# Patient Record
Sex: FEMALE | Race: WHITE | Employment: OTHER | URBAN - METROPOLITAN AREA
[De-identification: names, ages, dates, MRNs, and addresses within clinical notes are randomized per-mention and may not be internally consistent; named-entity substitution may affect disease eponyms.]

---

## 2023-10-30 ENCOUNTER — HOSPITAL ENCOUNTER (EMERGENCY)
Age: 68
Discharge: HOME | End: 2023-10-30
Attending: EMERGENCY MEDICINE
Payer: MEDICARE

## 2023-10-30 ENCOUNTER — APPOINTMENT (OUTPATIENT)
Dept: CT IMAGING | Age: 68
End: 2023-10-30
Payer: MEDICARE

## 2023-10-30 VITALS
BODY MASS INDEX: 34.73 KG/M2 | SYSTOLIC BLOOD PRESSURE: 155 MMHG | WEIGHT: 221.3 LBS | DIASTOLIC BLOOD PRESSURE: 89 MMHG | HEART RATE: 119 BPM | RESPIRATION RATE: 14 BRPM | OXYGEN SATURATION: 97 % | TEMPERATURE: 97.7 F | HEIGHT: 67 IN

## 2023-10-30 DIAGNOSIS — S01.111A EYEBROW LACERATION, RIGHT, INITIAL ENCOUNTER: Primary | ICD-10-CM

## 2023-10-30 PROCEDURE — 70450 CT HEAD/BRAIN W/O DYE: CPT

## 2023-10-30 PROCEDURE — 12011 RPR F/E/E/N/L/M 2.5 CM/<: CPT

## 2023-10-30 PROCEDURE — 99284 EMERGENCY DEPT VISIT MOD MDM: CPT

## 2023-10-30 PROCEDURE — 72125 CT NECK SPINE W/O DYE: CPT

## 2023-10-30 RX ORDER — LIDOCAINE HYDROCHLORIDE AND EPINEPHRINE 10; 10 MG/ML; UG/ML
20 INJECTION, SOLUTION INFILTRATION; PERINEURAL ONCE
Status: DISCONTINUED | OUTPATIENT
Start: 2023-10-30 | End: 2023-10-30 | Stop reason: HOSPADM

## 2023-10-30 ASSESSMENT — PAIN DESCRIPTION - PAIN TYPE: TYPE: ACUTE PAIN

## 2023-10-30 ASSESSMENT — PAIN - FUNCTIONAL ASSESSMENT
PAIN_FUNCTIONAL_ASSESSMENT: 0-10
PAIN_FUNCTIONAL_ASSESSMENT: NONE - DENIES PAIN

## 2023-10-30 ASSESSMENT — PAIN DESCRIPTION - LOCATION: LOCATION: HIP;FACE

## 2023-10-30 ASSESSMENT — PAIN DESCRIPTION - DESCRIPTORS: DESCRIPTORS: ACHING

## 2023-10-30 ASSESSMENT — PAIN DESCRIPTION - FREQUENCY: FREQUENCY: CONTINUOUS

## 2023-10-30 ASSESSMENT — PAIN DESCRIPTION - ORIENTATION: ORIENTATION: RIGHT

## 2023-10-30 ASSESSMENT — PAIN SCALES - GENERAL: PAINLEVEL_OUTOF10: 5

## 2023-10-30 NOTE — ED NOTES
Patient to ed with complaints of a a left eyebrow laceration and right buttock bruise after a trip and fall today, no loc bleeding controled.      Nish Reynolds RN  10/30/23 4056

## 2023-10-30 NOTE — ED NOTES
Patient given discharge instructions verbal and written, patient verbalized understanding. Alert/oriented X4, Clear speech.   Patient exhibits no distress, ambulates with steady gait per self leaving unit, no further request.      Kaitlin Elam RN  10/30/23 1131

## 2023-10-30 NOTE — DISCHARGE INSTRUCTIONS
As discussed, your CAT scan showed no intracranial injury. You may use triple antibiotic ointment or Neosporin over the wound.   Please have the stitches removed in 7 to 10 days, monitor for any signs of infection such as increased redness swelling or drainage in the next week

## 2023-10-30 NOTE — ED PROVIDER NOTES
[address] ENCOUNTER        Patient Name: Paula Campo  MRN: [id number]  [address] [facility] 1955  Date of evaluation: 10/30/2023  Provider: Rossana Simon MD  PCP: No primary care provider on file. Note Started: 10:10 AM EDT 10/30/23    CHIEF COMPLAINT       Laceration (Right eyebrow )      HISTORY OF PRESENT ILLNESS: 1 or more Elements     History from : Patient    Limitations to history : None    Paula Campo is a 76 y.o. female who presents for evaluation of fall, head injury. Patient states that she had misstep and falling. She hit right side of her head causing laceration near the right eyebrow. No loss consciousness. No vision changes. She is on a baby aspirin. She did fall onto the right buttock and has some mild swelling to the right buttock but has been ambulatory without pain in the hip. Tdap last updated 2015. Nursing Notes were all reviewed and agreed with or any disagreements were addressed in the HPI. REVIEW OF SYSTEMS :      Review of Systems    Positives and Pertinent negatives as per HPI. SURGICAL HISTORY     Past Surgical History:   Procedure Laterality Date    JOINT REPLACEMENT         CURRENTMEDICATIONS       Previous Medications    No medications on file       ALLERGIES     Penicillins    FAMILYHISTORY     No family history on file. SOCIAL HISTORY       Social History     Tobacco Use    Smoking status: Never    Smokeless tobacco: Never   Substance Use Topics    Alcohol use: Not Currently    Drug use: Never       SCREENINGS        Jewett Coma Scale  Eye Opening: Spontaneous  Best Verbal Response: Oriented  Best Motor Response: Obeys commands  Mandeep Coma Scale Score: 15                CIWA Assessment  BP: (!) 155/89  Pulse: (!) 119           PHYSICAL EXAM  1 or more Elements     ED Triage Vitals   BP Temp Temp src Pulse Resp SpO2 Height Weight   -- -- -- -- -- -- -- --       General: No acute distress.  Alert and

## 2023-11-05 ENCOUNTER — HOSPITAL ENCOUNTER (EMERGENCY)
Age: 68
Discharge: HOME | End: 2023-11-05
Attending: EMERGENCY MEDICINE
Payer: MEDICARE

## 2023-11-05 VITALS
WEIGHT: 224.7 LBS | BODY MASS INDEX: 34.05 KG/M2 | HEART RATE: 86 BPM | SYSTOLIC BLOOD PRESSURE: 161 MMHG | OXYGEN SATURATION: 95 % | HEIGHT: 68 IN | DIASTOLIC BLOOD PRESSURE: 81 MMHG | RESPIRATION RATE: 12 BRPM | TEMPERATURE: 97.7 F

## 2023-11-05 DIAGNOSIS — Z48.02 VISIT FOR SUTURE REMOVAL: Primary | ICD-10-CM

## 2023-11-05 PROCEDURE — 99283 EMERGENCY DEPT VISIT LOW MDM: CPT

## 2023-11-05 ASSESSMENT — PAIN - FUNCTIONAL ASSESSMENT: PAIN_FUNCTIONAL_ASSESSMENT: NONE - DENIES PAIN

## 2023-11-05 NOTE — DISCHARGE INSTRUCTIONS
Follow up with PCP for continued medical care and treatment. If persistent or worsening symptoms, or if you have any concerns, return to ED.

## 2023-11-05 NOTE — ED PROVIDER NOTES
punctuation, and spelling, as well as words and phrases that may be inappropriate.  If there are any questions or concerns please feel free to contact the dictating provider for clarification.)          Jose Boyce MD  11/05/23 [id number]